# Patient Record
Sex: MALE | Race: WHITE | URBAN - METROPOLITAN AREA
[De-identification: names, ages, dates, MRNs, and addresses within clinical notes are randomized per-mention and may not be internally consistent; named-entity substitution may affect disease eponyms.]

---

## 2022-12-25 ENCOUNTER — WEB ENCOUNTER (OUTPATIENT)
Dept: URBAN - METROPOLITAN AREA SURGERY CENTER 25 | Facility: SURGERY CENTER | Age: 52
End: 2022-12-25

## 2022-12-27 ENCOUNTER — OFFICE VISIT (OUTPATIENT)
Dept: URBAN - METROPOLITAN AREA SURGERY CENTER 25 | Facility: SURGERY CENTER | Age: 52
End: 2022-12-27
Payer: COMMERCIAL

## 2022-12-27 DIAGNOSIS — D12.5 ADENOMA OF SIGMOID COLON: ICD-10-CM

## 2022-12-27 DIAGNOSIS — Z12.11 COLON CANCER SCREENING: ICD-10-CM

## 2022-12-27 PROCEDURE — 45385 COLONOSCOPY W/LESION REMOVAL: CPT | Performed by: INTERNAL MEDICINE

## 2022-12-27 PROCEDURE — G8907 PT DOC NO EVENTS ON DISCHARG: HCPCS | Performed by: INTERNAL MEDICINE

## 2023-03-16 ENCOUNTER — DASHBOARD ENCOUNTERS (OUTPATIENT)
Age: 53
End: 2023-03-16

## 2023-03-16 ENCOUNTER — WEB ENCOUNTER (OUTPATIENT)
Dept: URBAN - METROPOLITAN AREA CLINIC 107 | Facility: CLINIC | Age: 53
End: 2023-03-16

## 2023-03-16 ENCOUNTER — OFFICE VISIT (OUTPATIENT)
Dept: URBAN - METROPOLITAN AREA CLINIC 107 | Facility: CLINIC | Age: 53
End: 2023-03-16
Payer: COMMERCIAL

## 2023-03-16 VITALS
WEIGHT: 277 LBS | RESPIRATION RATE: 18 BRPM | SYSTOLIC BLOOD PRESSURE: 121 MMHG | HEART RATE: 93 BPM | DIASTOLIC BLOOD PRESSURE: 98 MMHG | HEIGHT: 70 IN | TEMPERATURE: 98.2 F | BODY MASS INDEX: 39.65 KG/M2

## 2023-03-16 DIAGNOSIS — K57.30 COLON, DIVERTICULOSIS: ICD-10-CM

## 2023-03-16 DIAGNOSIS — Z86.010 HISTORY OF ADENOMATOUS POLYP OF COLON: ICD-10-CM

## 2023-03-16 DIAGNOSIS — K56.699 STRICTURE OF COLON DETERMINED BY ENDOSCOPY: ICD-10-CM

## 2023-03-16 PROBLEM — 733657002: Status: ACTIVE | Noted: 2023-03-16

## 2023-03-16 PROBLEM — 429047008: Status: ACTIVE | Noted: 2023-03-16

## 2023-03-16 PROCEDURE — 99213 OFFICE O/P EST LOW 20 MIN: CPT | Performed by: INTERNAL MEDICINE

## 2023-03-16 RX ORDER — AMLODIPINE BESYLATE AND OLMESARTAN MEDOXOMIL 10; 40 MG/1; MG/1
1 TABLET TABLET, FILM COATED ORAL ONCE A DAY
Status: ACTIVE | COMMUNITY

## 2023-03-16 NOTE — HPI-TODAY'S VISIT:
Mr. Valdivia is a 52-year-old gentleman presenting for follow-up after undergoing colonoscopy for colorectal cancer screening.  The colonoscopy on 12/27/2022 was notable for removal of 2 (3-5mm) polyps that were tubular adenoma and hyperplastic polyp, pancolonic diverticulosis predominantly in the left colon, sigmoid colon diverticular stricture traversed with the upper endoscope, grade 1 internal hemorrhoids.  He believes in 2019 he had a bout of diverticulitis treated with antibiotics.  Otherwise he denies any abdominal pain.  His bowel habits are regular normal consistency.  No melena or red blood per rectum.  Also denies any nausea, vomiting, heartburn or difficulty swallowing.

## 2024-07-26 ENCOUNTER — OFFICE VISIT (OUTPATIENT)
Dept: URBAN - METROPOLITAN AREA CLINIC 107 | Facility: CLINIC | Age: 54
End: 2024-07-26

## 2024-08-01 ENCOUNTER — LAB OUTSIDE AN ENCOUNTER (OUTPATIENT)
Dept: URBAN - METROPOLITAN AREA CLINIC 107 | Facility: CLINIC | Age: 54
End: 2024-08-01

## 2024-08-01 ENCOUNTER — TELEPHONE ENCOUNTER (OUTPATIENT)
Dept: URBAN - METROPOLITAN AREA CLINIC 107 | Facility: CLINIC | Age: 54
End: 2024-08-01

## 2024-08-01 ENCOUNTER — OFFICE VISIT (OUTPATIENT)
Dept: URBAN - METROPOLITAN AREA CLINIC 107 | Facility: CLINIC | Age: 54
End: 2024-08-01
Payer: COMMERCIAL

## 2024-08-01 VITALS
HEIGHT: 70 IN | DIASTOLIC BLOOD PRESSURE: 104 MMHG | WEIGHT: 278.4 LBS | TEMPERATURE: 97.9 F | OXYGEN SATURATION: 98 % | RESPIRATION RATE: 18 BRPM | SYSTOLIC BLOOD PRESSURE: 158 MMHG | BODY MASS INDEX: 39.86 KG/M2

## 2024-08-01 DIAGNOSIS — Z86.010 HISTORY OF ADENOMATOUS POLYP OF COLON: ICD-10-CM

## 2024-08-01 DIAGNOSIS — N32.1 COLOVESICAL FISTULA: ICD-10-CM

## 2024-08-01 DIAGNOSIS — K57.30 DIVERTICULOSIS OF COLON: ICD-10-CM

## 2024-08-01 DIAGNOSIS — K56.699 STRICTURE OF COLON DETERMINED BY ENDOSCOPY: ICD-10-CM

## 2024-08-01 PROBLEM — 28626004: Status: ACTIVE | Noted: 2024-08-01

## 2024-08-01 PROBLEM — 733657002: Status: ACTIVE | Noted: 2024-08-01

## 2024-08-01 PROBLEM — 1231824009: Status: ACTIVE | Noted: 2024-08-01

## 2024-08-01 PROCEDURE — 99214 OFFICE O/P EST MOD 30 MIN: CPT | Performed by: NURSE PRACTITIONER

## 2024-08-01 RX ORDER — AMLODIPINE BESYLATE AND OLMESARTAN MEDOXOMIL 10; 40 MG/1; MG/1
1 TABLET TABLET, FILM COATED ORAL ONCE A DAY
Status: ACTIVE | COMMUNITY

## 2024-08-01 NOTE — HPI-TODAY'S VISIT:
53-year-old male referred by Dr. 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 A copy of this note will be sent to the referring provider.   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   CT abdomen and pelvis with and without contrast 6/19/2024 showed mild acute uncomplicated sigmoid diverticulitis.  Shotty central not significantly enlarged mesenteric lymph nodes compatible with primary mesentery panniculitis.  Mild hepatic steatosis.  Subcentimeter probably benign bile duct hamartoma within the central right hepatic lobe. Labs 9/14/2023 CBC normal with Hgb 16.2.  Hemoglobin A1c 6.3.  CMP revealed glucose 111, creatinine 1.35. Colonoscopy on 12/27/2022 was notable for removal of 2 (3-5mm) polyps that were tubular adenoma and hyperplastic polyp, pancolonic diverticulosis predominantly in the left colon, sigmoid colon diverticular stricture traversed with the upper endoscope, grade 1 internal hemorrhoids. He believes in 2019 he had a bout of diverticulitis treated with antibiotics. He has not been treated for diverticulitis since.   He has air bubbles when urinates, had blood in his urine and does see brown particles on occasion. No recent UTI. No abdominal pain melena or hematochezia. Last saw bubbles yesterday afternoon. He denies alcohol use. No upper GI concerns.

## 2024-08-06 ENCOUNTER — OFFICE VISIT (OUTPATIENT)
Dept: URBAN - METROPOLITAN AREA SURGERY CENTER 25 | Facility: SURGERY CENTER | Age: 54
End: 2024-08-06

## 2024-08-06 ENCOUNTER — LAB OUTSIDE AN ENCOUNTER (OUTPATIENT)
Dept: URBAN - METROPOLITAN AREA CLINIC 107 | Facility: CLINIC | Age: 54
End: 2024-08-06

## 2024-08-27 ENCOUNTER — TELEPHONE ENCOUNTER (OUTPATIENT)
Dept: URBAN - METROPOLITAN AREA CLINIC 107 | Facility: CLINIC | Age: 54
End: 2024-08-27

## 2024-09-11 ENCOUNTER — OFFICE VISIT (OUTPATIENT)
Dept: URBAN - METROPOLITAN AREA SURGERY CENTER 25 | Facility: SURGERY CENTER | Age: 54
End: 2024-09-11